# Patient Record
Sex: FEMALE | Race: WHITE | NOT HISPANIC OR LATINO | URBAN - METROPOLITAN AREA
[De-identification: names, ages, dates, MRNs, and addresses within clinical notes are randomized per-mention and may not be internally consistent; named-entity substitution may affect disease eponyms.]

---

## 2017-04-11 ENCOUNTER — NEW PATIENT (OUTPATIENT)
Dept: URBAN - METROPOLITAN AREA CLINIC 44 | Facility: CLINIC | Age: 82
End: 2017-04-11
Payer: MEDICARE

## 2017-04-11 DIAGNOSIS — H57.11 OCULAR PAIN, RIGHT EYE: ICD-10-CM

## 2017-04-11 DIAGNOSIS — H40.023 OPEN ANGLE WITH BORDERLINE FINDINGS, HIGH RISK, BILATERAL: Primary | ICD-10-CM

## 2017-04-11 DIAGNOSIS — Z96.1 PRESENCE OF INTRAOCULAR LENS: ICD-10-CM

## 2017-04-11 PROCEDURE — 92133 CPTRZD OPH DX IMG PST SGM ON: CPT | Performed by: OPTOMETRIST

## 2017-04-11 PROCEDURE — 92004 COMPRE OPH EXAM NEW PT 1/>: CPT | Performed by: OPTOMETRIST

## 2017-04-11 PROCEDURE — 92020 GONIOSCOPY: CPT | Performed by: OPTOMETRIST

## 2017-04-11 RX ORDER — LATANOPROST 50 UG/ML
0.005 % SOLUTION OPHTHALMIC
Qty: 3 | Refills: 3 | Status: INACTIVE
Start: 2017-04-11 | End: 2018-04-03

## 2017-04-11 ASSESSMENT — VISUAL ACUITY
OS: 20/25
OD: 20/20

## 2017-04-11 ASSESSMENT — KERATOMETRY
OS: 43.88
OD: 43.50

## 2017-04-11 ASSESSMENT — INTRAOCULAR PRESSURE
OS: 18
OD: 30

## 2017-06-06 ENCOUNTER — FOLLOW UP ESTABLISHED (OUTPATIENT)
Dept: URBAN - METROPOLITAN AREA CLINIC 44 | Facility: CLINIC | Age: 82
End: 2017-06-06
Payer: MEDICARE

## 2017-06-06 DIAGNOSIS — H40.1131 PRIMARY OPEN-ANGLE GLAUCOMA, MILD STAGE, BILATERAL: Primary | ICD-10-CM

## 2017-06-06 PROCEDURE — 92012 INTRM OPH EXAM EST PATIENT: CPT | Performed by: OPTOMETRIST

## 2017-06-06 PROCEDURE — 92083 EXTENDED VISUAL FIELD XM: CPT | Performed by: OPTOMETRIST

## 2017-06-06 ASSESSMENT — INTRAOCULAR PRESSURE
OD: 15
OS: 14
OS: 12

## 2017-10-05 ENCOUNTER — FOLLOW UP ESTABLISHED (OUTPATIENT)
Dept: URBAN - METROPOLITAN AREA CLINIC 44 | Facility: CLINIC | Age: 82
End: 2017-10-05
Payer: MEDICARE

## 2017-10-05 PROCEDURE — 92012 INTRM OPH EXAM EST PATIENT: CPT | Performed by: OPTOMETRIST

## 2017-10-05 ASSESSMENT — INTRAOCULAR PRESSURE
OS: 12
OD: 14
OS: 13
OD: 16

## 2018-04-03 ENCOUNTER — FOLLOW UP ESTABLISHED (OUTPATIENT)
Dept: URBAN - METROPOLITAN AREA CLINIC 44 | Facility: CLINIC | Age: 83
End: 2018-04-03
Payer: MEDICARE

## 2018-04-03 DIAGNOSIS — H04.123 TEAR FILM INSUFFICIENCY OF BILATERAL LACRIMAL GLANDS: Primary | ICD-10-CM

## 2018-04-03 DIAGNOSIS — R51 HEADACHE: ICD-10-CM

## 2018-04-03 PROCEDURE — 92133 CPTRZD OPH DX IMG PST SGM ON: CPT | Performed by: OPTOMETRIST

## 2018-04-03 PROCEDURE — 92014 COMPRE OPH EXAM EST PT 1/>: CPT | Performed by: OPTOMETRIST

## 2018-04-03 RX ORDER — LATANOPROST 50 UG/ML
0.005 % SOLUTION OPHTHALMIC
Qty: 3 | Refills: 5 | Status: INACTIVE
Start: 2018-04-03 | End: 2019-05-22

## 2018-04-03 ASSESSMENT — INTRAOCULAR PRESSURE
OD: 12
OS: 12

## 2018-04-03 ASSESSMENT — VISUAL ACUITY
OS: 20/25
OD: 20/20

## 2018-04-03 ASSESSMENT — KERATOMETRY
OS: 43.88
OD: 43.38

## 2019-06-25 ENCOUNTER — FOLLOW UP ESTABLISHED (OUTPATIENT)
Dept: URBAN - METROPOLITAN AREA CLINIC 44 | Facility: CLINIC | Age: 84
End: 2019-06-25
Payer: MEDICARE

## 2019-06-25 DIAGNOSIS — H26.492 OTHER SECONDARY CATARACT, LEFT EYE: ICD-10-CM

## 2019-06-25 PROCEDURE — 92014 COMPRE OPH EXAM EST PT 1/>: CPT | Performed by: OPTOMETRIST

## 2019-06-25 PROCEDURE — 92083 EXTENDED VISUAL FIELD XM: CPT | Performed by: OPTOMETRIST

## 2019-06-25 PROCEDURE — 92133 CPTRZD OPH DX IMG PST SGM ON: CPT | Performed by: OPTOMETRIST

## 2019-06-25 RX ORDER — LATANOPROST 50 UG/ML
0.005 % SOLUTION OPHTHALMIC
Qty: 3 | Refills: 3 | Status: INACTIVE
Start: 2019-06-25 | End: 2021-11-05

## 2019-06-25 ASSESSMENT — VISUAL ACUITY
OD: 20/25
OS: 20/25

## 2019-06-25 ASSESSMENT — KERATOMETRY
OS: 44.13
OD: 43.13

## 2019-06-25 ASSESSMENT — INTRAOCULAR PRESSURE
OD: 17
OS: 16

## 2020-02-20 ENCOUNTER — FOLLOW UP ESTABLISHED (OUTPATIENT)
Dept: URBAN - METROPOLITAN AREA CLINIC 44 | Facility: CLINIC | Age: 85
End: 2020-02-20
Payer: MEDICARE

## 2020-02-20 PROCEDURE — 92133 CPTRZD OPH DX IMG PST SGM ON: CPT | Performed by: OPTOMETRIST

## 2020-02-20 PROCEDURE — 92012 INTRM OPH EXAM EST PATIENT: CPT | Performed by: OPTOMETRIST

## 2020-02-20 ASSESSMENT — INTRAOCULAR PRESSURE
OS: 15
OD: 15

## 2022-08-11 ENCOUNTER — OFFICE VISIT (OUTPATIENT)
Dept: URBAN - METROPOLITAN AREA CLINIC 44 | Facility: CLINIC | Age: 87
End: 2022-08-11
Payer: MEDICARE

## 2022-08-11 DIAGNOSIS — Z96.1 PRESENCE OF INTRAOCULAR LENS: ICD-10-CM

## 2022-08-11 DIAGNOSIS — H40.1121 PRIMARY OPEN-ANGLE GLAUCOMA, MILD STAGE, LEFT EYE: ICD-10-CM

## 2022-08-11 DIAGNOSIS — H40.1113 PRIMARY OPEN-ANGLE GLAUCOMA, SEVERE STAGE, RIGHT EYE: ICD-10-CM

## 2022-08-11 DIAGNOSIS — H26.492 OTHER SECONDARY CATARACT, LEFT EYE: ICD-10-CM

## 2022-08-11 DIAGNOSIS — H04.123 DRY EYE SYNDROME OF BILATERAL LACRIMAL GLANDS: Primary | ICD-10-CM

## 2022-08-11 PROCEDURE — 92134 CPTRZ OPH DX IMG PST SGM RTA: CPT | Performed by: OPTOMETRIST

## 2022-08-11 PROCEDURE — 92133 CPTRZD OPH DX IMG PST SGM ON: CPT | Performed by: OPTOMETRIST

## 2022-08-11 PROCEDURE — 99214 OFFICE O/P EST MOD 30 MIN: CPT | Performed by: OPTOMETRIST

## 2022-08-11 RX ORDER — LATANOPROST 50 UG/ML
0.005 % SOLUTION OPHTHALMIC
Qty: 7.5 | Refills: 1 | Status: ACTIVE
Start: 2022-08-11

## 2022-08-11 RX ORDER — BRIMONIDINE TARTRATE 2 MG/ML
0.2 % SOLUTION/ DROPS OPHTHALMIC
Qty: 10 | Refills: 3 | Status: ACTIVE
Start: 2022-08-11

## 2022-08-11 ASSESSMENT — INTRAOCULAR PRESSURE
OD: 15
OD: 21
OS: 13
OS: 16

## 2022-08-11 ASSESSMENT — KERATOMETRY
OD: 43.50
OS: 44.13

## 2022-08-11 ASSESSMENT — VISUAL ACUITY
OD: 20/25
OS: 20/30

## 2022-08-11 NOTE — IMPRESSION/PLAN
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 Plan: Cupping OD>OS --> progression OD in 2022 vs 2019 Family hx: none Pachymetry: [id number] IOP: 21/13 Current gtts: latanoprost QHS OU Gonio (04/11/17): open to  OU OCT RNFL (08/11/22): OD 58 (thin overall, sup/temp/inf thinning) OS 78 (wnl) -- stable HVF (06/25/19): OD early sup depression; reliable OS essentially full; reliable OCT and optic nerve appearance worse OD, stable OS. Start brimonidine BID OU. Continue latanoprost QHS OU.   RTC 2 months for IOP + 24-2 HVF

## 2022-10-11 ENCOUNTER — OFFICE VISIT (OUTPATIENT)
Dept: URBAN - METROPOLITAN AREA CLINIC 44 | Facility: CLINIC | Age: 87
End: 2022-10-11
Payer: MEDICARE

## 2022-10-11 DIAGNOSIS — H40.1113 PRIMARY OPEN-ANGLE GLAUCOMA, RIGHT EYE, SEVERE STAGE: Primary | ICD-10-CM

## 2022-10-11 PROCEDURE — 92083 EXTENDED VISUAL FIELD XM: CPT | Performed by: OPTOMETRIST

## 2022-10-11 PROCEDURE — 99213 OFFICE O/P EST LOW 20 MIN: CPT | Performed by: OPTOMETRIST

## 2022-10-11 RX ORDER — DORZOLAMIDE HCL 20 MG/ML
2 % SOLUTION/ DROPS OPHTHALMIC
Qty: 5 | Refills: 3 | Status: ACTIVE
Start: 2022-10-11

## 2022-10-11 ASSESSMENT — INTRAOCULAR PRESSURE
OD: 15
OS: 12
OD: 19
OS: 15

## 2022-10-11 NOTE — IMPRESSION/PLAN
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 Plan: Cupping OD>OS --> progression OD in 2022 vs 2019 Family hx: none Pachymetry: [id number] IOP: 19/12 Current gtts: latanoprost QHS OU Gonio (04/11/17): open to  OU OCT RNFL (08/11/22): OD 58 (thin overall, sup/temp/inf thinning) OS 78 (wnl) -- stable HVF (10/11/22): OD superior arcuate  (reliable) OS  full (unreliable) OCT and optic nerve appearance worse OD, stable OS. Unable to tolerate brimonidine. Start dorzolamide BID OD. RTC 3 weeks for IOP check.

## 2023-03-08 ENCOUNTER — OFFICE VISIT (OUTPATIENT)
Dept: URBAN - METROPOLITAN AREA CLINIC 44 | Facility: CLINIC | Age: 88
End: 2023-03-08
Payer: MEDICARE

## 2023-03-08 DIAGNOSIS — L30.9 DERMATITIS, UNSPECIFIED: ICD-10-CM

## 2023-03-08 DIAGNOSIS — H40.1113 PRIMARY OPEN-ANGLE GLAUCOMA, SEVERE STAGE, RIGHT EYE: Primary | ICD-10-CM

## 2023-03-08 PROCEDURE — 99214 OFFICE O/P EST MOD 30 MIN: CPT | Performed by: OPTOMETRIST

## 2023-03-08 RX ORDER — NEOMYCIN SULFATE, POLYMYXIN B SULFATE AND DEXAMETHASONE 3.5; 10000; 1 MG/G; [USP'U]/G; MG/G
OINTMENT OPHTHALMIC
Qty: 3.5 | Refills: 0 | Status: ACTIVE
Start: 2023-03-08

## 2023-03-08 ASSESSMENT — INTRAOCULAR PRESSURE
OS: 17
OD: 18
OD: 13
OS: 13

## 2023-03-08 NOTE — IMPRESSION/PLAN
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 Plan: Cupping OD>OS --> progression OD in 2022 vs 2019 Family hx: none Pachymetry: [id number] IOP: 18/13 Current gtts: latanoprost QHS OU, dorzolamide BID OD Possible heart issues -- avoid beta blockers Drop hx: 
     -irritation to brimonidine
     -irritation to dorzolamide Gonio (04/11/17): open to  OU OCT RNFL (08/11/22): OD 58 (thin overall, sup/temp/inf thinning) OS 78 (wnl) -- stable HVF (10/11/22): OD superior arcuate  (reliable) OS  full (unreliable) IOP unchanged. Also, mild dermatitis BUL>ADELAIDE and new papillomas. D/c dorzolamide. Recommend refer to Glaucoma.

## 2023-03-08 NOTE — IMPRESSION/PLAN
Impression: Dermatitis, unspecified: L30.9. Plan: Non-specific dermatitis BUL>BLL. Possibly due or exacerbated to dorzolamide and/or new papillomas. Avoid triamcinolone cream because it may end up in eye. Start maxitrol elie TID to lids x 1 week then stop. Discussed side effects of over-using steroid ointment. RTC 2 weeks for follow up.

## 2023-05-03 ENCOUNTER — OFFICE VISIT (OUTPATIENT)
Dept: URBAN - METROPOLITAN AREA CLINIC 44 | Facility: CLINIC | Age: 88
End: 2023-05-03
Payer: MEDICARE

## 2023-05-03 DIAGNOSIS — H40.1131 PRIMARY OPEN-ANGLE GLAUCOMA, MILD STAGE, BILATERAL: Primary | ICD-10-CM

## 2023-05-03 PROCEDURE — 99203 OFFICE O/P NEW LOW 30 MIN: CPT | Performed by: OPHTHALMOLOGY

## 2023-05-03 ASSESSMENT — INTRAOCULAR PRESSURE
OD: 25
OS: 19

## 2023-05-03 NOTE — IMPRESSION/PLAN
Impression: Primary open-angle glaucoma, mild stage, bilateral: H40.1131. Plan: PT IS A POAG OU       
PT WAS REFERRED BY YISEL 
PT DENIES FAMILY HX OF GLAUCOMA
PT DENIES SULFA ALLERGY 
PT REPORTS DENIES LUNG DZ PT WISHES TO AVOID BETA BLOCKERS 
TARGET IOP MID TEENS OR LESS 
RECOMMEND : 
1. AGREE W DR. Adia Mar THAT PATIENT IS AT RISK FOR GLAUCOMA 2. CONTINUE LATANOPROST OU QHS (START 5+ YEARS ) 3. PT REPORTS TRYING ALL OTHER MEDICATIONS WITH NO PRESSURE REDUCTION 4. OFFERED PT OPTION OF SLT OD THAN OS, PT DEFERS BUT WILL RE-CONSIDER IN NEAR FUTURE 5/3/23

## 2023-07-11 ENCOUNTER — OFFICE VISIT (OUTPATIENT)
Dept: URBAN - METROPOLITAN AREA CLINIC 44 | Facility: CLINIC | Age: 88
End: 2023-07-11
Payer: MEDICARE

## 2023-07-11 DIAGNOSIS — D48.1 NEOPLASM OF UNCERTIAN BEHAVIOR OF EYELID: Primary | ICD-10-CM

## 2023-07-11 PROCEDURE — 99214 OFFICE O/P EST MOD 30 MIN: CPT | Performed by: OPHTHALMOLOGY

## 2023-07-11 PROCEDURE — 92285 EXTERNAL OCULAR PHOTOGRAPHY: CPT | Performed by: OPHTHALMOLOGY

## 2023-07-11 NOTE — IMPRESSION/PLAN
Impression: Neoplasm of uncertian behavior of eyelid: D48.1. Plan: Discussed daily artificial tears to improve with dry eye, and informed can purchase eye  drops over the counter , three to four times daily.

## 2023-11-03 ENCOUNTER — OFFICE VISIT (OUTPATIENT)
Dept: URBAN - METROPOLITAN AREA CLINIC 44 | Facility: CLINIC | Age: 88
End: 2023-11-03
Payer: MEDICARE

## 2023-11-03 DIAGNOSIS — H40.1131 PRIMARY OPEN-ANGLE GLAUCOMA, MILD STAGE, BILATERAL: Primary | ICD-10-CM

## 2023-11-03 PROCEDURE — 99213 OFFICE O/P EST LOW 20 MIN: CPT | Performed by: OPHTHALMOLOGY

## 2023-11-03 ASSESSMENT — INTRAOCULAR PRESSURE
OD: 23
OS: 15

## 2024-06-25 ENCOUNTER — OFFICE VISIT (OUTPATIENT)
Dept: URBAN - METROPOLITAN AREA CLINIC 44 | Facility: CLINIC | Age: 89
End: 2024-06-25
Payer: MEDICARE

## 2024-06-25 DIAGNOSIS — H04.123 DRY EYE SYNDROME OF BILATERAL LACRIMAL GLANDS: ICD-10-CM

## 2024-06-25 DIAGNOSIS — H40.1131 PRIMARY OPEN-ANGLE GLAUCOMA, BILATERAL, MILD STAGE: Primary | ICD-10-CM

## 2024-06-25 DIAGNOSIS — H26.492 OTHER SECONDARY CATARACT, LEFT EYE: ICD-10-CM

## 2024-06-25 PROCEDURE — 99214 OFFICE O/P EST MOD 30 MIN: CPT | Performed by: OPTOMETRIST

## 2024-06-25 PROCEDURE — 92134 CPTRZ OPH DX IMG PST SGM RTA: CPT | Performed by: OPTOMETRIST

## 2024-06-25 PROCEDURE — 92133 CPTRZD OPH DX IMG PST SGM ON: CPT | Performed by: OPTOMETRIST

## 2024-06-25 ASSESSMENT — INTRAOCULAR PRESSURE
OS: 12
OD: 24
OD: 13

## 2024-06-25 ASSESSMENT — KERATOMETRY
OS: 44.25
OD: 43.38

## 2024-06-25 ASSESSMENT — VISUAL ACUITY
OS: 20/25
OD: 20/20

## 2024-12-23 ENCOUNTER — OFFICE VISIT (OUTPATIENT)
Dept: URBAN - METROPOLITAN AREA CLINIC 44 | Facility: CLINIC | Age: 89
End: 2024-12-23
Payer: MEDICARE

## 2024-12-23 DIAGNOSIS — H40.1131 PRIMARY OPEN-ANGLE GLAUCOMA, BILATERAL, MILD STAGE: Primary | ICD-10-CM

## 2024-12-23 PROCEDURE — 92083 EXTENDED VISUAL FIELD XM: CPT | Performed by: OPTOMETRIST

## 2024-12-23 PROCEDURE — 99213 OFFICE O/P EST LOW 20 MIN: CPT | Performed by: OPTOMETRIST

## 2024-12-23 RX ORDER — LATANOPROST OPHTHALMIC SOLUTION 0.005% 50 UG/ML
0.005 % SOLUTION/ DROPS TOPICAL
Qty: 30 | Refills: 3 | Status: INACTIVE
Start: 2024-12-23 | End: 2024-12-23

## 2024-12-23 RX ORDER — LATANOPROST OPHTHALMIC SOLUTION 0.005% 50 UG/ML
0.005 % SOLUTION/ DROPS TOPICAL
Qty: 30 | Refills: 3 | Status: ACTIVE
Start: 2024-12-23

## 2024-12-23 ASSESSMENT — INTRAOCULAR PRESSURE
OD: 11
OD: 21
OS: 16
OS: 12

## 2025-03-27 ENCOUNTER — OFFICE VISIT (OUTPATIENT)
Dept: URBAN - METROPOLITAN AREA CLINIC 44 | Facility: CLINIC | Age: OVER 89
End: 2025-03-27
Payer: COMMERCIAL

## 2025-03-27 DIAGNOSIS — H04.123 DRY EYE SYNDROME OF BILATERAL LACRIMAL GLANDS: ICD-10-CM

## 2025-03-27 DIAGNOSIS — H40.1131 PRIMARY OPEN-ANGLE GLAUCOMA, BILATERAL, MILD STAGE: Primary | ICD-10-CM

## 2025-03-27 PROCEDURE — 99213 OFFICE O/P EST LOW 20 MIN: CPT | Performed by: OPTOMETRIST

## 2025-03-27 ASSESSMENT — INTRAOCULAR PRESSURE
OD: 11
OS: 11
OS: 17
OD: 20